# Patient Record
Sex: MALE | Race: WHITE | HISPANIC OR LATINO | Employment: FULL TIME | URBAN - NONMETROPOLITAN AREA
[De-identification: names, ages, dates, MRNs, and addresses within clinical notes are randomized per-mention and may not be internally consistent; named-entity substitution may affect disease eponyms.]

---

## 2018-02-25 ENCOUNTER — OFFICE VISIT (OUTPATIENT)
Dept: URGENT CARE | Facility: PHYSICIAN GROUP | Age: 33
End: 2018-02-25
Payer: COMMERCIAL

## 2018-02-25 VITALS
RESPIRATION RATE: 16 BRPM | HEIGHT: 70 IN | SYSTOLIC BLOOD PRESSURE: 118 MMHG | WEIGHT: 231 LBS | TEMPERATURE: 98.1 F | DIASTOLIC BLOOD PRESSURE: 76 MMHG | OXYGEN SATURATION: 98 % | HEART RATE: 91 BPM | BODY MASS INDEX: 33.07 KG/M2

## 2018-02-25 DIAGNOSIS — K11.20 PAROTIDITIS: ICD-10-CM

## 2018-02-25 PROCEDURE — 99214 OFFICE O/P EST MOD 30 MIN: CPT | Performed by: NURSE PRACTITIONER

## 2018-02-25 RX ORDER — CLINDAMYCIN HYDROCHLORIDE 300 MG/1
300 CAPSULE ORAL 3 TIMES DAILY
Qty: 21 QUANTITY SUFFICIENT | Refills: 0 | Status: SHIPPED | OUTPATIENT
Start: 2018-02-25

## 2018-02-25 ASSESSMENT — PAIN SCALES - GENERAL: PAINLEVEL: NO PAIN

## 2018-02-25 ASSESSMENT — ENCOUNTER SYMPTOMS
MYALGIAS: 0
FEVER: 0
NAUSEA: 0
HEADACHES: 0
SORE THROAT: 0
VOMITING: 0

## 2018-02-25 NOTE — PROGRESS NOTES
"Subjective:      Dayday Parisi is a 32 y.o. male who presents with Other (swollen lymph nodes)            HPI New problem. 32 year old male with swollen lymph nodes on right cheek area. Area is mildly painful to touch. He denies fever, chills, myalgia, nausea or diarrhea. He has noticed this in the past couple of days. He has no ear pain or congestion. Not taking any medication for this problem.  Patient has no known allergies.  No current outpatient prescriptions on file prior to visit.     No current facility-administered medications on file prior to visit.      Social History     Social History   • Marital status: Single     Spouse name: N/A   • Number of children: N/A   • Years of education: N/A     Occupational History   • Not on file.     Social History Main Topics   • Smoking status: Current Every Day Smoker     Types: Cigarettes   • Smokeless tobacco: Never Used      Comment: smokes about 2-3 cigs per day for 7 mos.   • Alcohol use 2.0 oz/week     4 Cans of beer per week      Comment: sometimes has a beer after work   • Drug use: No   • Sexual activity: Yes     Partners: Female      Comment:      Other Topics Concern   • Not on file     Social History Narrative   • No narrative on file     family history includes Arthritis in his mother.      Review of Systems   Constitutional: Negative for fever.   HENT: Negative for congestion, ear pain and sore throat.    Gastrointestinal: Negative for nausea and vomiting.   Musculoskeletal: Negative for myalgias.   Neurological: Negative for headaches.          Objective:     /76   Pulse 91   Temp 36.7 °C (98.1 °F)   Resp 16   Ht 1.778 m (5' 10\")   Wt 104.8 kg (231 lb)   SpO2 98%   BMI 33.15 kg/m²      Physical Exam   Constitutional: He is oriented to person, place, and time. He appears well-developed and well-nourished. No distress.   HENT:   Head: Normocephalic and atraumatic.   Right Ear: External ear and ear canal normal. Tympanic membrane is " not injected and not perforated. No middle ear effusion.   Left Ear: External ear and ear canal normal. Tympanic membrane is not injected and not perforated.  No middle ear effusion.   Nose: No mucosal edema.   Mouth/Throat: No oropharyngeal exudate or posterior oropharyngeal erythema.   Eyes: Conjunctivae are normal. Right eye exhibits no discharge. Left eye exhibits no discharge.   Neck: Normal range of motion. Neck supple.   Mild swelling at right side of neck with tenderness. No redness.   Cardiovascular: Normal rate, regular rhythm and normal heart sounds.    No murmur heard.  Pulmonary/Chest: Effort normal and breath sounds normal. No respiratory distress.   Musculoskeletal: Normal range of motion.   Normal movement of all 4 extremities.   Lymphadenopathy:     He has no cervical adenopathy.        Right: No supraclavicular adenopathy present.        Left: No supraclavicular adenopathy present.   Neurological: He is alert and oriented to person, place, and time. Gait normal.   Skin: Skin is warm and dry.   Psychiatric: He has a normal mood and affect. His behavior is normal. Thought content normal.   Nursing note and vitals reviewed.              Assessment/Plan:     1. Parotiditis  clindamycin (CLEOCIN) 300 MG Cap     Differential diagnosis, natural history, supportive care, and indications for immediate follow-up discussed at length.

## 2018-08-10 ENCOUNTER — NON-PROVIDER VISIT (OUTPATIENT)
Dept: URGENT CARE | Facility: PHYSICIAN GROUP | Age: 33
End: 2018-08-10

## 2018-08-10 PROCEDURE — 92553 AUDIOMETRY AIR & BONE: CPT | Performed by: FAMILY MEDICINE

## 2019-06-14 ENCOUNTER — OFFICE VISIT (OUTPATIENT)
Dept: URGENT CARE | Facility: CLINIC | Age: 34
End: 2019-06-14

## 2019-06-14 ENCOUNTER — HOSPITAL ENCOUNTER (OUTPATIENT)
Facility: MEDICAL CENTER | Age: 34
End: 2019-06-14
Attending: PHYSICIAN ASSISTANT
Payer: COMMERCIAL

## 2019-06-14 VITALS
OXYGEN SATURATION: 97 % | HEART RATE: 73 BPM | SYSTOLIC BLOOD PRESSURE: 118 MMHG | HEIGHT: 70 IN | WEIGHT: 246.6 LBS | DIASTOLIC BLOOD PRESSURE: 84 MMHG | TEMPERATURE: 98 F | BODY MASS INDEX: 35.3 KG/M2 | RESPIRATION RATE: 14 BRPM

## 2019-06-14 DIAGNOSIS — Z02.1 PHYSICAL EXAM, PRE-EMPLOYMENT: Primary | ICD-10-CM

## 2019-06-14 DIAGNOSIS — Z02.1 PRE-EMPLOYMENT DRUG SCREENING: ICD-10-CM

## 2019-06-14 DIAGNOSIS — Z02.1 PHYSICAL EXAM, PRE-EMPLOYMENT: ICD-10-CM

## 2019-06-14 LAB
ALBUMIN SERPL BCP-MCNC: 5 G/DL (ref 3.2–4.9)
ALBUMIN/GLOB SERPL: 1.7 G/DL
ALP SERPL-CCNC: 58 U/L (ref 30–99)
ALT SERPL-CCNC: 83 U/L (ref 2–50)
AMP AMPHETAMINE: NORMAL
ANION GAP SERPL CALC-SCNC: 12 MMOL/L (ref 0–11.9)
APPEARANCE UR: CLEAR
AST SERPL-CCNC: 43 U/L (ref 12–45)
BASOPHILS # BLD AUTO: 0.7 % (ref 0–1.8)
BASOPHILS # BLD: 0.06 K/UL (ref 0–0.12)
BILIRUB SERPL-MCNC: 0.4 MG/DL (ref 0.1–1.5)
BILIRUB UR QL STRIP.AUTO: NEGATIVE
BUN SERPL-MCNC: 11 MG/DL (ref 8–22)
CALCIUM SERPL-MCNC: 10.2 MG/DL (ref 8.5–10.5)
CHLORIDE SERPL-SCNC: 103 MMOL/L (ref 96–112)
CO2 SERPL-SCNC: 22 MMOL/L (ref 20–33)
COC COCAINE: NORMAL
COLOR UR: YELLOW
CREAT SERPL-MCNC: 0.93 MG/DL (ref 0.5–1.4)
EOSINOPHIL # BLD AUTO: 0.16 K/UL (ref 0–0.51)
EOSINOPHIL NFR BLD: 1.8 % (ref 0–6.9)
ERYTHROCYTE [DISTWIDTH] IN BLOOD BY AUTOMATED COUNT: 44.9 FL (ref 35.9–50)
GLOBULIN SER CALC-MCNC: 3 G/DL (ref 1.9–3.5)
GLUCOSE SERPL-MCNC: 98 MG/DL (ref 65–99)
GLUCOSE UR STRIP.AUTO-MCNC: NEGATIVE MG/DL
HCT VFR BLD AUTO: 48 % (ref 42–52)
HGB BLD-MCNC: 15.3 G/DL (ref 14–18)
IMM GRANULOCYTES # BLD AUTO: 0.05 K/UL (ref 0–0.11)
IMM GRANULOCYTES NFR BLD AUTO: 0.6 % (ref 0–0.9)
INT CON NEG: NEGATIVE
INT CON POS: POSITIVE
KETONES UR STRIP.AUTO-MCNC: NEGATIVE MG/DL
LEUKOCYTE ESTERASE UR QL STRIP.AUTO: NEGATIVE
LYMPHOCYTES # BLD AUTO: 2.32 K/UL (ref 1–4.8)
LYMPHOCYTES NFR BLD: 25.6 % (ref 22–41)
MCH RBC QN AUTO: 29.4 PG (ref 27–33)
MCHC RBC AUTO-ENTMCNC: 31.9 G/DL (ref 33.7–35.3)
MCV RBC AUTO: 92.1 FL (ref 81.4–97.8)
MET METHAMPHETAMINES: NORMAL
MONOCYTES # BLD AUTO: 0.63 K/UL (ref 0–0.85)
MONOCYTES NFR BLD AUTO: 6.9 % (ref 0–13.4)
NEUTROPHILS # BLD AUTO: 5.86 K/UL (ref 1.82–7.42)
NEUTROPHILS NFR BLD: 64.4 % (ref 44–72)
NITRITE UR QL STRIP.AUTO: NEGATIVE
NRBC # BLD AUTO: 0 K/UL
NRBC BLD-RTO: 0 /100 WBC
OPI OPIATES: NORMAL
PCP PHENCYCLIDINE: NORMAL
PH UR STRIP.AUTO: 7.5 [PH]
PLATELET # BLD AUTO: 399 K/UL (ref 164–446)
PMV BLD AUTO: 10.4 FL (ref 9–12.9)
POC DRUG COMMENT 753798-OCCUPATIONAL HEALTH: NORMAL
POTASSIUM SERPL-SCNC: 3.8 MMOL/L (ref 3.6–5.5)
PROT SERPL-MCNC: 8 G/DL (ref 6–8.2)
PROT UR QL STRIP: NEGATIVE MG/DL
RBC # BLD AUTO: 5.21 M/UL (ref 4.7–6.1)
RBC UR QL AUTO: NEGATIVE
SODIUM SERPL-SCNC: 137 MMOL/L (ref 135–145)
SP GR UR STRIP.AUTO: 1.02
THC: NORMAL
UROBILINOGEN UR STRIP.AUTO-MCNC: 0.2 MG/DL
WBC # BLD AUTO: 9.1 K/UL (ref 4.8–10.8)

## 2019-06-14 PROCEDURE — 94010 BREATHING CAPACITY TEST: CPT | Performed by: PHYSICIAN ASSISTANT

## 2019-06-14 PROCEDURE — 8915 PR COMPREHENSIVE PHYSICAL: Performed by: PHYSICIAN ASSISTANT

## 2019-06-14 PROCEDURE — 80305 DRUG TEST PRSMV DIR OPT OBS: CPT | Performed by: PHYSICIAN ASSISTANT

## 2019-06-14 NOTE — PROGRESS NOTES
"Subjective:      Dayday Parisi is a 33 y.o. male who presents with Employment Physical ([employer])    Pt PMH, SocHx, SurgHx, FamHx, Drug allergies and medications reviewed with pt/EPIC.      Family history reviewed, it is not pertinent to this complaint.         Pt PMH, SocHx, SurgHx, FamHx, Drug allergies and medications reviewed with pt/EPIC.      Family history reviewed, it is not pertinent to this complaint.     Pre employment, [employer]        Review of Systems   All other systems reviewed and are negative.    See scanned documents for ROS  results.       Objective:     /84 (BP Location: Right arm, Patient Position: Sitting)   Pulse 73   Temp 36.7 °C (98 °F) (Temporal)   Resp 14   Ht 1.778 m (5' 10\")   Wt 111.9 kg (246 lb 9.6 oz)   SpO2 97%   BMI 35.38 kg/m²      Physical Exam       See scanned documents for exam results.       Assessment/Plan:     1. Physical exam, pre-employment     2. Pre-employment drug screening  POCT 6 Panel Urine Drug Screen       "

## 2019-06-20 LAB
LEAD BLD-MCNC: <2 UG/DL (ref 0–4.9)
ZPP RBC-MCNC: 19 UG/DL (ref 0–40)
ZPP RBC-SRTO: 32 UMOLZPP/MOLHEM (ref 0–69)

## 2019-07-18 ENCOUNTER — HOSPITAL ENCOUNTER (OUTPATIENT)
Facility: MEDICAL CENTER | Age: 34
End: 2019-07-18
Attending: NURSE PRACTITIONER
Payer: COMMERCIAL

## 2019-07-18 ENCOUNTER — NON-PROVIDER VISIT (OUTPATIENT)
Dept: URGENT CARE | Facility: CLINIC | Age: 34
End: 2019-07-18
Payer: COMMERCIAL

## 2019-07-18 DIAGNOSIS — Z77.011 LEAD EXPOSURE: ICD-10-CM

## 2019-07-18 PROCEDURE — 83655 ASSAY OF LEAD: CPT | Performed by: NURSE PRACTITIONER

## 2019-07-18 PROCEDURE — 84202 ASSAY RBC PROTOPORPHYRIN: CPT | Performed by: NURSE PRACTITIONER

## 2019-07-22 LAB
LEAD BLD-MCNC: 4 UG/DL (ref 0–4.9)
ZPP RBC-MCNC: 19 UG/DL (ref 0–40)
ZPP RBC-SRTO: 32 UMOLZPP/MOLHEM (ref 0–69)

## 2019-08-15 ENCOUNTER — HOSPITAL ENCOUNTER (OUTPATIENT)
Facility: MEDICAL CENTER | Age: 34
End: 2019-08-15
Attending: PHYSICIAN ASSISTANT
Payer: COMMERCIAL

## 2019-08-15 ENCOUNTER — NON-PROVIDER VISIT (OUTPATIENT)
Dept: URGENT CARE | Facility: CLINIC | Age: 34
End: 2019-08-15
Payer: COMMERCIAL

## 2019-08-15 DIAGNOSIS — Z77.011 LEAD EXPOSURE: ICD-10-CM

## 2019-08-15 PROCEDURE — 83655 ASSAY OF LEAD: CPT | Performed by: PHYSICIAN ASSISTANT

## 2019-08-15 PROCEDURE — 84202 ASSAY RBC PROTOPORPHYRIN: CPT | Performed by: PHYSICIAN ASSISTANT

## 2019-08-20 LAB
LEAD BLD-MCNC: 6.2 UG/DL (ref 0–4.9)
ZPP RBC-MCNC: 18 UG/DL (ref 0–40)
ZPP RBC-SRTO: 31 UMOLZPP/MOLHEM (ref 0–69)

## 2019-09-12 ENCOUNTER — NON-PROVIDER VISIT (OUTPATIENT)
Dept: URGENT CARE | Facility: CLINIC | Age: 34
End: 2019-09-12
Payer: COMMERCIAL

## 2019-09-12 ENCOUNTER — HOSPITAL ENCOUNTER (OUTPATIENT)
Facility: MEDICAL CENTER | Age: 34
End: 2019-09-12
Attending: NURSE PRACTITIONER
Payer: COMMERCIAL

## 2019-09-12 DIAGNOSIS — Z77.011 LEAD EXPOSURE: ICD-10-CM

## 2019-09-12 PROCEDURE — 83655 ASSAY OF LEAD: CPT | Performed by: NURSE PRACTITIONER

## 2019-09-12 PROCEDURE — 84202 ASSAY RBC PROTOPORPHYRIN: CPT | Performed by: NURSE PRACTITIONER

## 2019-09-13 DIAGNOSIS — Z77.011 LEAD EXPOSURE: ICD-10-CM

## 2019-09-20 LAB
LEAD BLD-MCNC: 6.9 UG/DL (ref 0–4.9)
ZPP RBC-MCNC: 16 UG/DL (ref 0–40)
ZPP RBC-SRTO: 28 UMOLZPP/MOLHEM (ref 0–69)

## 2019-12-19 ENCOUNTER — OFFICE VISIT (OUTPATIENT)
Dept: URGENT CARE | Facility: PHYSICIAN GROUP | Age: 34
End: 2019-12-19
Payer: COMMERCIAL

## 2019-12-19 VITALS
BODY MASS INDEX: 34.44 KG/M2 | WEIGHT: 240 LBS | TEMPERATURE: 97.8 F | SYSTOLIC BLOOD PRESSURE: 120 MMHG | HEART RATE: 110 BPM | OXYGEN SATURATION: 98 % | DIASTOLIC BLOOD PRESSURE: 78 MMHG | RESPIRATION RATE: 16 BRPM

## 2019-12-19 DIAGNOSIS — J11.1 INFLUENZA-LIKE ILLNESS: ICD-10-CM

## 2019-12-19 DIAGNOSIS — Z20.828 EXPOSURE TO THE FLU: ICD-10-CM

## 2019-12-19 LAB
FLUAV+FLUBV AG SPEC QL IA: NEGATIVE
INT CON NEG: NORMAL
INT CON POS: NORMAL

## 2019-12-19 PROCEDURE — 99214 OFFICE O/P EST MOD 30 MIN: CPT | Performed by: PHYSICIAN ASSISTANT

## 2019-12-19 PROCEDURE — 87804 INFLUENZA ASSAY W/OPTIC: CPT | Performed by: PHYSICIAN ASSISTANT

## 2019-12-19 ASSESSMENT — ENCOUNTER SYMPTOMS
DIARRHEA: 0
HEADACHES: 1
SPUTUM PRODUCTION: 0
DIZZINESS: 0
SHORTNESS OF BREATH: 0
NECK PAIN: 0
VOMITING: 0
WHEEZING: 0
CHILLS: 1
MYALGIAS: 1
EYE REDNESS: 0
TINGLING: 0
COUGH: 1
SORE THROAT: 0
EYE DISCHARGE: 0

## 2019-12-19 NOTE — PROGRESS NOTES
Subjective:      Dayday Parisi is a 34 y.o. male who presents with Flu Like Symptoms (wife came back positive for flu B )            Patient is a 34-year-old male who presents to urgent care concern regarding possible flu as patient's wife was diagnosed with influenza B yesterday.  Patient reports cough, congestion, body aches.  He denies recent fever as he has been checking it with a thermometer however feels as though he should have been running 1.  He has been taking over-the-counter cough and flu medications with good relief.  He denies influenza vaccination.    URI    This is a new problem. The current episode started yesterday. The problem has been unchanged. Maximum temperature: subjective fevers. Associated symptoms include congestion, coughing and headaches. Pertinent negatives include no diarrhea, dysuria, ear pain, neck pain, plugged ear sensation, rash, sore throat, vomiting or wheezing. Treatments tried: As above. The treatment provided mild relief.       Review of Systems   Constitutional: Positive for chills and malaise/fatigue.   HENT: Positive for congestion. Negative for ear discharge, ear pain and sore throat.    Eyes: Negative for discharge and redness.   Respiratory: Positive for cough. Negative for sputum production, shortness of breath and wheezing.    Cardiovascular: Negative for leg swelling.   Gastrointestinal: Negative for diarrhea and vomiting.   Genitourinary: Negative for dysuria and urgency.   Musculoskeletal: Positive for myalgias. Negative for neck pain.   Skin: Negative for itching and rash.   Neurological: Positive for headaches. Negative for dizziness and tingling.          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  MEDS: Reviewed .   ALLERGIES: No Known Allergies  SURGHX: No past surgical history on file.  SOCHX:  reports that he has been smoking cigarettes. He has never used smokeless tobacco. He reports current alcohol use of about 2.0 oz of alcohol per week. He reports that he does not use drugs.  FH: Family history was reviewed, no pertinent findings to report    Physical Exam  Vitals signs reviewed.   Constitutional:       Appearance: Normal appearance. He is well-developed.   HENT:      Head: Normocephalic and atraumatic.      Ears:      Comments: Bilateral clear middle ear effusions.     Nose:      Comments: Rhinorrhea noted.     Mouth/Throat:      Comments: Posterior oropharynx is erythematous, positive postnasal drainage.  No evidence of exudate.  Eyes:      Conjunctiva/sclera: Conjunctivae normal.      Pupils: Pupils are equal, round, and reactive to light.   Neck:      Musculoskeletal: Normal range of motion and neck supple.   Cardiovascular:      Rate and Rhythm: Regular rhythm. Tachycardia present.      Heart sounds: No murmur.   Pulmonary:      Effort: Pulmonary effort is normal. No respiratory distress.      Breath sounds: Normal breath sounds.   Musculoskeletal: Normal range of motion.      Right lower leg: No edema.      Left lower leg: No edema.   Lymphadenopathy:      Cervical: No cervical adenopathy.   Skin:     General: Skin is warm.      Findings: No rash.   Neurological:      Mental Status: He is alert and oriented to person, place, and time.   Psychiatric:         Mood and Affect: Mood normal.         Behavior: Behavior normal.         Thought Content: Thought content normal.                 Assessment/Plan:       1. Influenza-like illness  - POCT Influenza A/B    2. Exposure to the flu  - POCT Influenza A/B    Influenza negative.  Still have high suspicion for influenza at this time and discussed this with the patient.  He does not want to begin Tamiflu at this time and would rather  continue with over-the-counter therapies.  Discussed contagious nature of symptoms.  Patient given precautionary s/sx that mandate immediate follow up and evaluation in the ED. Advised of risks of not doing so.    DDX, Supportive care, and indications for immediate follow-up discussed with patient.    Instructed to return to clinic or nearest emergency department if we are not available for any change in condition, further concerns, or worsening of symptoms.    The patient demonstrated a good understanding and agreed with the treatment plan.  Please note that this dictation was created using voice recognition software. I have made every reasonable attempt to correct obvious errors, but I expect that there are errors of grammar and possibly content that I did not discover before finalizing the note.

## 2020-01-17 ENCOUNTER — OFFICE VISIT (OUTPATIENT)
Dept: URGENT CARE | Facility: CLINIC | Age: 35
End: 2020-01-17

## 2020-01-17 VITALS
HEIGHT: 70 IN | BODY MASS INDEX: 35.03 KG/M2 | HEART RATE: 98 BPM | OXYGEN SATURATION: 98 % | SYSTOLIC BLOOD PRESSURE: 128 MMHG | TEMPERATURE: 98.6 F | RESPIRATION RATE: 14 BRPM | DIASTOLIC BLOOD PRESSURE: 68 MMHG | WEIGHT: 244.71 LBS

## 2020-01-17 DIAGNOSIS — Z02.1 PRE-EMPLOYMENT DRUG SCREENING: ICD-10-CM

## 2020-01-17 LAB
AMP AMPHETAMINE: NORMAL
BAR BARBITURATES: NORMAL
BZO BENZODIAZEPINES: NORMAL
COC COCAINE: NORMAL
INT CON NEG: NORMAL
INT CON POS: NORMAL
MDMA ECSTASY: NORMAL
MET METHAMPHETAMINES: NORMAL
MTD METHADONE: NORMAL
OPI OPIATES: NORMAL
OXY OXYCODONE: NORMAL
PCP PHENCYCLIDINE: NORMAL
POC URINE DRUG SCREEN OCDRS: NORMAL
THC: NORMAL

## 2020-01-17 PROCEDURE — 80305 DRUG TEST PRSMV DIR OPT OBS: CPT | Performed by: INTERNAL MEDICINE

## 2020-01-17 PROCEDURE — 8915 PR COMPREHENSIVE PHYSICAL: Performed by: INTERNAL MEDICINE

## 2020-01-17 NOTE — PROGRESS NOTES
"Subjective:   Dayday Parisi is a 34 y.o. male who presents for Employment Physical (Px/UDS)        HPI  ROS  No Known Allergies   Objective:   /68   Pulse 98   Temp 37 °C (98.6 °F) (Temporal)   Resp 14   Ht 1.778 m (5' 10\")   Wt 111 kg (244 lb 11.4 oz)   SpO2 98%   BMI 35.11 kg/m²   Physical Exam      Assessment/Plan:   1. Pre-employment drug screening  - POCT 11 Panel Urine Drug Screen  see form    Differential diagnosis, natural history, supportive care, and indications for immediate follow-up discussed.       "

## 2020-11-03 ENCOUNTER — OFFICE VISIT (OUTPATIENT)
Dept: URGENT CARE | Facility: CLINIC | Age: 35
End: 2020-11-03

## 2020-11-03 VITALS
DIASTOLIC BLOOD PRESSURE: 80 MMHG | SYSTOLIC BLOOD PRESSURE: 106 MMHG | HEIGHT: 70 IN | TEMPERATURE: 97.8 F | HEART RATE: 76 BPM | WEIGHT: 230 LBS | BODY MASS INDEX: 32.93 KG/M2 | OXYGEN SATURATION: 95 % | RESPIRATION RATE: 14 BRPM

## 2020-11-03 DIAGNOSIS — Z02.89 ENCOUNTER FOR OCCUPATIONAL HEALTH ASSESSMENT: ICD-10-CM

## 2020-11-03 PROCEDURE — 94010 BREATHING CAPACITY TEST: CPT | Performed by: NURSE PRACTITIONER

## 2020-11-03 PROCEDURE — 8916 PR CLEARANCE ONLY: Performed by: NURSE PRACTITIONER

## 2020-11-03 PROCEDURE — 92552 PURE TONE AUDIOMETRY AIR: CPT | Performed by: NURSE PRACTITIONER

## 2020-11-03 ASSESSMENT — ENCOUNTER SYMPTOMS
SPUTUM PRODUCTION: 0
COUGH: 0
HEMOPTYSIS: 0
ORTHOPNEA: 0
CHILLS: 0
DIAPHORESIS: 0
WHEEZING: 0
FEVER: 0
PALPITATIONS: 0
SHORTNESS OF BREATH: 0

## 2020-11-03 ASSESSMENT — FIBROSIS 4 INDEX: FIB4 SCORE: 0.38

## 2020-11-03 NOTE — PROGRESS NOTES
"Subjective:      Dayday Parisi is a 34 y.o. male who presents with Medical Clearance (Resp. Clearance, Audio)            Encounter for occupational health assessment: audiometry and spirometry clearance.  Dayday is deaf in his right ear since OM at age 14.  No further OM episodes.  He has no symptoms or concerns today.      Review of Systems   Constitutional: Negative for chills, diaphoresis, fever and malaise/fatigue.   HENT: Positive for hearing loss. Negative for ear discharge, ear pain and tinnitus.    Respiratory: Negative for cough, hemoptysis, sputum production, shortness of breath and wheezing.    Cardiovascular: Negative for chest pain, palpitations and orthopnea.   Skin: Negative for rash.     Medications, Allergies, and current problem list reviewed today in Epic     Objective:     Blood Pressure 106/80   Pulse 76   Temperature 36.6 °C (97.8 °F) (Temporal)   Respiration 14   Height 1.778 m (5' 10\")   Weight 104.3 kg (230 lb)   Oxygen Saturation 95%   Body Mass Index 33.00 kg/m²      Physical Exam  Vitals signs reviewed.   Constitutional:       General: He is not in acute distress.     Appearance: Normal appearance. He is not ill-appearing, toxic-appearing or diaphoretic.   HENT:      Right Ear: Tympanic membrane, ear canal and external ear normal. There is no impacted cerumen.      Left Ear: Tympanic membrane, ear canal and external ear normal. There is no impacted cerumen.   Cardiovascular:      Rate and Rhythm: Normal rate and regular rhythm.      Heart sounds: Normal heart sounds.   Pulmonary:      Effort: Pulmonary effort is normal. No respiratory distress.      Breath sounds: Normal breath sounds. No stridor. No wheezing, rhonchi or rales.   Chest:      Chest wall: No tenderness.   Neurological:      Mental Status: He is alert and oriented to person, place, and time.                 Assessment/Plan:        1. Encounter for occupational health assessment    Discussed exam findings with " Dayday.  See scanned forms.  Follow up with PCP for routine health care, screening, and maintenance.

## 2022-01-10 ENCOUNTER — OFFICE VISIT (OUTPATIENT)
Dept: URGENT CARE | Facility: PHYSICIAN GROUP | Age: 37
End: 2022-01-10

## 2022-01-10 DIAGNOSIS — Z02.1 ENCOUNTER FOR PRE-EMPLOYMENT HEALTH SCREENING EXAMINATION: ICD-10-CM

## 2022-01-10 DIAGNOSIS — H91.8X1 OTHER SPECIFIED HEARING LOSS OF RIGHT EAR, UNSPECIFIED HEARING STATUS ON CONTRALATERAL SIDE: ICD-10-CM

## 2022-01-10 PROCEDURE — 92552 PURE TONE AUDIOMETRY AIR: CPT | Performed by: PHYSICIAN ASSISTANT

## 2022-01-10 NOTE — PROGRESS NOTES
The patient presents to clinic for respiratory clearance and audio testing.    The patient has a history of chronic hearing loss of the right ear.  The patient's audiogram showed normal hearing of the left ear with hearing loss of the right ear, which is consistent with the patient's past medical history.  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    The patient's lungs were clear to auscultation without wheezing.  The patient's heart was regular rate and rhythm without murmurs, gallops, or rubs.    See scanned documentation in media for further details.

## 2022-12-27 ENCOUNTER — OFFICE VISIT (OUTPATIENT)
Dept: URGENT CARE | Facility: PHYSICIAN GROUP | Age: 37
End: 2022-12-27

## 2022-12-27 VITALS
RESPIRATION RATE: 18 BRPM | DIASTOLIC BLOOD PRESSURE: 86 MMHG | WEIGHT: 255 LBS | TEMPERATURE: 97.3 F | BODY MASS INDEX: 35.7 KG/M2 | OXYGEN SATURATION: 97 % | HEART RATE: 78 BPM | SYSTOLIC BLOOD PRESSURE: 118 MMHG | HEIGHT: 71 IN

## 2022-12-27 DIAGNOSIS — Z01.89 RESPIRATORY CLEARANCE EXAMINATION, ENCOUNTER FOR: ICD-10-CM

## 2022-12-27 DIAGNOSIS — Z01.10 ENCOUNTER FOR AUDIOLOGY EVALUATION: ICD-10-CM

## 2022-12-27 PROCEDURE — 8916 PR CLEARANCE ONLY: Performed by: NURSE PRACTITIONER

## 2022-12-27 PROCEDURE — 92552 PURE TONE AUDIOMETRY AIR: CPT | Performed by: NURSE PRACTITIONER

## 2022-12-27 NOTE — PROGRESS NOTES
No major medical history, no chronic medications. NoNo history of asthma, heart disease, murmurs, seizure disorder or syncopal episodes with activity.Chronic Right ear hearing loss due to childhood illness. Stable   Please see the chart for further information, however normal physical exam, cleared for employment without restrictions.     STEVAN Candelario.

## 2023-12-18 ENCOUNTER — OFFICE VISIT (OUTPATIENT)
Dept: URGENT CARE | Facility: CLINIC | Age: 38
End: 2023-12-18

## 2023-12-18 VITALS
HEART RATE: 86 BPM | WEIGHT: 255 LBS | SYSTOLIC BLOOD PRESSURE: 122 MMHG | OXYGEN SATURATION: 95 % | BODY MASS INDEX: 35.7 KG/M2 | DIASTOLIC BLOOD PRESSURE: 68 MMHG | TEMPERATURE: 98.9 F | HEIGHT: 71 IN | RESPIRATION RATE: 14 BRPM

## 2023-12-18 DIAGNOSIS — Z02.1 PHYSICAL EXAM, PRE-EMPLOYMENT: ICD-10-CM

## 2023-12-18 PROCEDURE — 3074F SYST BP LT 130 MM HG: CPT

## 2023-12-18 PROCEDURE — 8915 PR COMPREHENSIVE PHYSICAL

## 2023-12-18 PROCEDURE — 3078F DIAST BP <80 MM HG: CPT

## 2024-05-30 ENCOUNTER — OFFICE VISIT (OUTPATIENT)
Dept: URGENT CARE | Facility: PHYSICIAN GROUP | Age: 39
End: 2024-05-30
Payer: COMMERCIAL

## 2024-05-30 ENCOUNTER — APPOINTMENT (OUTPATIENT)
Dept: RADIOLOGY | Facility: IMAGING CENTER | Age: 39
End: 2024-05-30
Payer: COMMERCIAL

## 2024-05-30 VITALS
SYSTOLIC BLOOD PRESSURE: 118 MMHG | TEMPERATURE: 99.2 F | RESPIRATION RATE: 16 BRPM | DIASTOLIC BLOOD PRESSURE: 82 MMHG | OXYGEN SATURATION: 97 % | HEIGHT: 70 IN | WEIGHT: 268 LBS | BODY MASS INDEX: 38.37 KG/M2 | HEART RATE: 102 BPM

## 2024-05-30 DIAGNOSIS — U07.1 COVID-19: ICD-10-CM

## 2024-05-30 DIAGNOSIS — R09.81 NASAL CONGESTION: ICD-10-CM

## 2024-05-30 DIAGNOSIS — J02.9 PHARYNGITIS, UNSPECIFIED ETIOLOGY: ICD-10-CM

## 2024-05-30 DIAGNOSIS — R05.1 ACUTE COUGH: ICD-10-CM

## 2024-05-30 LAB
FLUAV RNA SPEC QL NAA+PROBE: NEGATIVE
FLUBV RNA SPEC QL NAA+PROBE: NEGATIVE
RSV RNA SPEC QL NAA+PROBE: NEGATIVE
S PYO DNA SPEC NAA+PROBE: NOT DETECTED
SARS-COV-2 RNA RESP QL NAA+PROBE: POSITIVE

## 2024-05-30 PROCEDURE — 71046 X-RAY EXAM CHEST 2 VIEWS: CPT | Mod: TC,FY | Performed by: RADIOLOGY

## 2024-05-30 RX ORDER — FLUTICASONE PROPIONATE 50 MCG
1 SPRAY, SUSPENSION (ML) NASAL DAILY
Qty: 16 G | Refills: 0 | Status: SHIPPED | OUTPATIENT
Start: 2024-05-30

## 2024-05-30 ASSESSMENT — ENCOUNTER SYMPTOMS
NAUSEA: 0
VOMITING: 0
SORE THROAT: 1
RHINORRHEA: 1
ABDOMINAL PAIN: 1
NECK PAIN: 0
WHEEZING: 0
DIARRHEA: 0
SINUS PAIN: 0
COUGH: 1
HEADACHES: 1
SWOLLEN GLANDS: 1

## 2024-05-30 NOTE — PATIENT INSTRUCTIONS
"As we discussed your clinical condition would benefit from over-the-counter remedies:    Congestion:    Nasal rinsing with saline nasal spray or salt water (e.g., neti pot) can help relieve nasal dryness.    Breathe Right nasal strips at night for nasal congestion    Ponaris nasal emmollient for nasal congestion, dryness, and inflammation (do not use with iodine sensitivity)    Cool mist humidification, chest rubs, warm tea with honey, increased fluid intake to thin secretions    SALINE IRRIGATION/SPRAY 2 to 3 times per day    You may consider using a saline nasal irrigation (such as a \"Neti pot\" or similar device using sterile water, NOT tap water) or OTC saline nasal spray. Common brands include Macario Med, Sinex, [place] Nasal. May use short term nasal sprays, such as oxymetazoline (Afrin) to help relieve nasal discomfort, congestion, and/or pressure. Decongestant sprays should not be used longer than three consecutive days.     Over the counter medications:    OTC second generation antihistamine daily (cetirizine, desloratadine, fexofenadine, levocetirizine, and loratadine) daily IN COMBINATION WITH:    FLONASE (once per day) x 2 weeks     You may consider intranasal steroids such as fluticasone (brand name Flonase), (other options include Nasonex, Rhinocort, Nasacort) daily; continue for at least 2-3 weeks. Any generic should work as well but may cause slightly more nasal irritation. Please take according to package directions.  This steroid will help reduce inflammation of your sinuses.  This is the number one medication to help with seasonal allergies and treat nasal inflammation.  Cost: around $8 at [facility] for the generic fluticasone [facility] product (as of 5/20).    SUDAFED (low dose to see if tolerated) daily x 4-5 days    You may consider over-the-counter Sudafed (pseudoephedrine) to act as a decongestant to improve your breathing through your nose.  Please do not use this medication if you already have known " high blood pressure.  Please take according to package directions and note that this has a stimulating effect and should not be taken late in the day.  There is a low dose 12 hour formulation and a higher dose 24 hour formulation.  Start with a low dose to make sure you tolerate the medication.  Do not take late in the day as it may interfere with sleep.   Cost: Around $6 for the generic [facility] branded product at a 10mg dose (as of 2/2023).      SORE THROAT:    Symptom management for a sore throat includes:     Sipping cold or warm beverages (eg, tea with honey or lemon)     Eating cold or frozen desserts (eg, ice cream, popsicles)    Sucking on ice    Sucking on hard candy, CEPACOL lozenges, or medicated throat sprays. These contain the active ingredient benzocaine which is an anesthetic agent.  It helps relieve the symptoms of sore throat and may diminish your cough reflex.Please note that taking too frequently may cause harm - pay attention to package directions.    Gargling with warm salt water -  ¼ to ½ teaspoon of salt per 8 ounces (approximately 240 mL) of warm water.  Cool mist humidification  OTC Tylenol/ibuprofen PRN for pain/fever     PAIN OR FEVER:    NSAIDs  You may take Ibuprofen (brand name Motrin or Advil) 600 to 800 mg may be taken up to three times per day taken with food (do not exceed 2400 mg per day).  If you prefer you may consider Naproxen (brand name Naprosyn or Aleve) around 500 mg twice per day with food (do not exceed 1200 mg per day). Please do not take if you have known stomach ulcers or known kidney disease.     TYLENOL  You may take Tylenol according to package directions (1000 mg every 8 hours not to exceed 3000 mg per day).  Please do not consume with any alcohol products, or if you have known or suspected liver disease. These will help reduce inflammation, help with pain control, and can reduce fevers.

## 2024-05-30 NOTE — PROGRESS NOTES
Subjective:   Dayday Parisi is a very pleasant 38 y.o. male who presents for:    Chief Complaint   Patient presents with    Sore Throat     X last night with body aches, some cough and fever. .  Family members tested Positive for Covid.        HPI:    URI   The current episode started yesterday. The maximum temperature recorded prior to his arrival was 102 - 102.9 F. The fever has been present for 1 to 2 days. Associated symptoms include abdominal pain, congestion, coughing, headaches, rhinorrhea, a sore throat and swollen glands. Pertinent negatives include no diarrhea, dysuria, ear pain, nausea, neck pain, plugged ear sensation, rash, sinus pain, sneezing, vomiting or wheezing. Treatments tried: DayQuil. The treatment provided no relief.     Wife tested positive for COVID five days ago. History of childhood asthma. No known exposure to strep or mono.    ROS:    Review of Systems   HENT:  Positive for congestion, rhinorrhea and sore throat. Negative for ear pain, sinus pain and sneezing.    Respiratory:  Positive for cough. Negative for wheezing.    Gastrointestinal:  Positive for abdominal pain. Negative for diarrhea, nausea and vomiting.   Genitourinary:  Negative for dysuria.   Musculoskeletal:  Negative for neck pain.   Skin:  Negative for rash.   Neurological:  Positive for headaches.       Medications:      No current outpatient medications on file.       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    Past Social Hx:     Social History     Socioeconomic History    Marital status:    Tobacco Use    Smoking status: Some Days     Types: Cigarettes    Smokeless tobacco: Never    Tobacco comments:     smokes about 2-3 cigs per day for 7 mos.   Vaping Use     Vaping status: Never Used   Substance and Sexual Activity    Alcohol use: Yes     Alcohol/week: 2.0 oz     Types: 4 Cans of beer per week     Comment: sometimes has a beer after work    Drug use: No    Sexual activity: Yes     Partners: Female     Comment:         Past Family Hx:      Family History   Problem Relation Age of Onset    Arthritis Mother        Problem list, medications, and allergies reviewed by myself today in Epic.     Objective:     Vitals:    05/30/24 1100   BP: 118/82   Pulse: (!) 102   Resp: 16   Temp: 37.3 °C (99.2 °F)   SpO2: 97%       Physical Exam  Vitals reviewed.   Constitutional:       General: He is not in acute distress.     Appearance: Normal appearance. He is not ill-appearing, toxic-appearing or diaphoretic.   HENT:      Head: Normocephalic and atraumatic.      Right Ear: Tympanic membrane, ear canal and external ear normal.      Left Ear: Tympanic membrane, ear canal and external ear normal.      Nose: Congestion and rhinorrhea present.      Mouth/Throat:      Mouth: Mucous membranes are moist.      Pharynx: Oropharynx is clear. Posterior oropharyngeal erythema present. No oropharyngeal exudate.   Eyes:      Extraocular Movements: Extraocular movements intact.      Conjunctiva/sclera: Conjunctivae normal.      Pupils: Pupils are equal, round, and reactive to light.   Cardiovascular:      Rate and Rhythm: Normal rate and regular rhythm.      Pulses: Normal pulses.      Heart sounds: Normal heart sounds.   Pulmonary:      Effort: Pulmonary effort is normal.      Breath sounds: Examination of the right-lower field reveals rhonchi. Examination of the left-lower field reveals rhonchi. Rhonchi present.   Abdominal:      General: Abdomen is flat.      Palpations: Abdomen is soft.   Musculoskeletal:         General: Normal range of motion.      Cervical back: Normal range of motion and neck supple. No rigidity or tenderness.   Lymphadenopathy:      Cervical: No cervical adenopathy.    Skin:     General: Skin is warm and dry.   Neurological:      General: No focal deficit present.      Mental Status: He is alert and oriented to person, place, and time. Mental status is at baseline.   Psychiatric:         Mood and Affect: Mood normal.         Behavior: Behavior normal.         Thought Content: Thought content normal.         Judgment: Judgment normal.       Results from POCT:     Results for orders placed or performed in visit on 05/30/24   POCT CoV-2, Flu A/B, RSV by PCR   Result Value Ref Range    SARS-CoV-2 by PCR Positive (A) Negative, Invalid    Influenza virus A RNA Negative Negative, Invalid    Influenza virus B, PCR Negative Negative, Invalid    RSV, PCR Negative Negative, Invalid   POCT CEPHEID GROUP A STREP - PCR   Result Value Ref Range    POC Group A Strep, PCR Not Detected Not Detected, Invalid        Assessment/Plan:     Diagnosis and associated orders:     1. COVID-19  - POCT CoV-2, Flu A/B, RSV by PCR  - Nirmatrelvir&Ritonavir 300/100 20 x 150 MG & 10 x 100MG Tablet Therapy Pack; Take 300 mg nirmatrelvir (two 150 mg tablets) with 100 mg ritonavir (one 100 mg tablet) by mouth, with all three tablets taken together twice daily for 5 days.  Dispense: 30 Each; Refill: 0    2. Acute cough  - POCT CoV-2, Flu A/B, RSV by PCR  - DX-CHEST-2 VIEWS    3. Pharyngitis, unspecified etiology  - POCT CEPHEID GROUP A STREP - PCR    4. Nasal congestion  - fluticasone (FLONASE) 50 MCG/ACT nasal spray; Administer 1 Spray into affected nostril(S) every day.  Dispense: 16 g; Refill: 0          Comments/MDM:     POCT Covid +  Prescription for Paxlovid sent to patient's preferred pharmacy for the treatment of COVID-19 per patient request. Patient denies kidney impairment, verbalizes risks vs benefits of antiviral therapy  I discussed self isolation and provided printed instructions. Educated patient to follow all CDC guidelines regarding infection prevention.  She will need to quarantine for 5 days after  onset of symptoms and wear tight fitting mask from day 6 through 10.  Work note provided  No evidence of lower respiratory involvement on exam today      Recommend symptomatic care:  OTC second generation antihistamine daily (cetirizine, desloratadine, fexofenadine, levocetirizine, and loratadine) daily IN COMBINATION WITH:  OTC decongestant (Sudafed - Pseudoephedrine) unless contraindication in place, such as hypertension, CAD, narrow-angle glaucoma. Use with caution if the patient has a history of cardiac dysrhythmias, hyperthyroidism, DM, prostatic hypertrophy, and glaucoma should use with caution.  Intranasal fluticasone (Flonase) daily  Nasal saline rinses 2-3 times a day   May use short term nasal sprays, such as oxymetazoline (Afrin) to help relieve nasal discomfort, congestion, and/or pressure. Decongestant sprays should not be used longer than three consecutive days.   Nasal rinsing with saline nasal spray or salt water (e.g., neti pot) can help relieve nasal dryness.  Breathe Right nasal strips at night for nasal congestion  Ponaris nasal emmollient for nasal congestion, dryness, and inflammation (do not use with iodine sensitivity)  Cool mist humidification, chest rubs, warm tea with honey, increased fluid intake to thin secretions  Tylenol or ibuprofen as needed for fever control, body aches, and headaches.    If sore throat is present:   Warm salt water gargles, over-the-counter throat sprays, rest, hydration with frozen (eg, ice or popsicles) or warmed liquids, herbal tea containing licorice root, elm inner bark, marshmallow root, and licorice root aqueous dry extract, Cepacol lozenges, soft diet, honey, vitamin C, zinc lozenges, and elderberry supplements.    Return to clinic or go to the ED if symptoms worsen or fail to improve, or if the patient should develop worsening/increasing sinus pain/pressure, congestion, ear pain, sore throat, headache, cough, shortness of breath, wheezing, chest pain,  fever/chills, and/or any concerning symptoms. Patient is in agreement with treatment plan.            All questions answered. Patient verbalized understanding and is in agreement with this plan of care.     If symptoms are worsening or not improving in 3-5 days, follow-up with PCP or return to UC. Differential diagnosis, natural history, and supportive care discussed. AVS handout given and reviewed with patient. Patient educated on red flags and when to seek treatment back in ED or UC.     I personally reviewed prior external notes and test results pertinent to today's visit.  I have independently reviewed and interpreted all diagnostics ordered during this urgent care visit.     This dictation has been created using voice recognition software. The accuracy of the dictation is limited by the abilities of the software. I expect there may be some errors of grammar and possibly content. I made every attempt to manually correct the errors within my dictation. However, errors related to voice recognition software may still exist and should be interpreted within the appropriate context.    This note was electronically signed by SIMEON Roth

## 2024-05-30 NOTE — LETTER
May 30, 2024    To Whom It May Concern:         This is confirmation that Dayday Parisi attended his scheduled appointment with SIMEON Lynne on 5/30/24. The patient was diagnosed with COVID-19. Please excuse him from work from 5/30/24 - 6/3/24. He may return on 6/4/24.         If you have any questions please do not hesitate to call me at the phone number listed below.    Sincerely,          Samantha Alvarado A.P.R.N.  [phone]

## 2025-03-09 NOTE — PROGRESS NOTES
"Subjective:   Dayday Parisi is a 38 y.o. male who presents for Medical Clearance (Resp. Clearance/Audio. )      Dayday Parisi presents to Urgent Care for preemployment physical.  Pt reports they are in general good health and does not anticipate any difficulty performing expected job duties. Discussed results of exam with pt. Cleared for work with no restrictions. See scanned form for full history      Medications, Allergies, and current problem list reviewed today in Epic.     Objective:     /68   Pulse 86   Temp 37.2 °C (98.9 °F) (Temporal)   Resp 14   Ht 1.803 m (5' 11\")   Wt 116 kg (255 lb)   SpO2 95%     See scanned form for full exam    Assessment/Plan:     1. Physical exam, pre-employment      - See scanned documentation  - Addressed any patient/guardian concerns  - Any red flags addressed in documentation    Advised the patient to follow-up with the primary care physician for recheck, reevaluation, and consideration of further management.    Please note that this dictation was created using voice recognition software. I have made a reasonable attempt to correct obvious errors, but I expect that there are errors of grammar and possibly content that I did not discover before finalizing the note.    This note was electronically signed by CESARIO Ro    " Your evaluation suggests that your symptoms are due to a non emergent cause.    Please follow up with your primary care physician within two days.    Return to the Emergency Department if you experience worsening or concerning symptoms.    Thank you for choosing us for your care.    Acosta evaluación sugiere que angel síntomas se deben a tremayne causa no emergente.    Zac un seguimiento con acosta médico de atención primaria dentro de dos días.    Regrese al Departamento de Emergencias si experimenta síntomas preocupantes o que empeoran.    Shobha por elegirnos para acosta atención.